# Patient Record
Sex: FEMALE | Race: WHITE | NOT HISPANIC OR LATINO | Employment: UNEMPLOYED | URBAN - NONMETROPOLITAN AREA
[De-identification: names, ages, dates, MRNs, and addresses within clinical notes are randomized per-mention and may not be internally consistent; named-entity substitution may affect disease eponyms.]

---

## 2017-04-25 ENCOUNTER — TELEPHONE (OUTPATIENT)
Dept: GASTROENTEROLOGY | Facility: CLINIC | Age: 47
End: 2017-04-25

## 2017-04-25 ENCOUNTER — DOCUMENTATION (OUTPATIENT)
Dept: GASTROENTEROLOGY | Facility: CLINIC | Age: 47
End: 2017-04-25

## 2017-04-25 DIAGNOSIS — R74.8 ELEVATED ALKALINE PHOSPHATASE LEVEL: Primary | ICD-10-CM

## 2017-04-25 DIAGNOSIS — R93.2 ABNORMAL CT OF LIVER: ICD-10-CM

## 2017-04-25 NOTE — PROGRESS NOTES
Called pt to see if she wanted to schedule for hepatic panel and liver us , left pt a message to call back if she wanted to schedule.

## 2017-04-25 NOTE — TELEPHONE ENCOUNTER
Patient is going to call insurance company and see how much she is going to be out of pocket with having the Ultrasound. She stated she will call back and let us know.

## 2017-09-22 ENCOUNTER — HOSPITAL ENCOUNTER (OUTPATIENT)
Dept: ULTRASOUND IMAGING | Facility: HOSPITAL | Age: 47
Discharge: HOME | End: 2017-09-22
Admitting: PHYSICIAN ASSISTANT

## 2017-09-22 DIAGNOSIS — R74.8 ELEVATED ALKALINE PHOSPHATASE LEVEL: ICD-10-CM

## 2017-09-22 DIAGNOSIS — R93.2 ABNORMAL CT OF LIVER: ICD-10-CM

## 2017-09-22 PROCEDURE — 76705 ECHO EXAM OF ABDOMEN: CPT | Performed by: RADIOLOGY

## 2017-09-22 PROCEDURE — 76705 ECHO EXAM OF ABDOMEN: CPT

## 2017-09-26 ENCOUNTER — TELEPHONE (OUTPATIENT)
Dept: GASTROENTEROLOGY | Facility: CLINIC | Age: 47
End: 2017-09-26

## 2017-09-26 NOTE — TELEPHONE ENCOUNTER
Patient has office visit scheduled 10/09/17. She used to see Dr Ramos. She had an U/S done and she said please tell her the results now because if not she will worry herself to death!

## 2017-09-28 NOTE — TELEPHONE ENCOUNTER
Left message with request for patient to have labs done prior to her appointment 10/9/17. I left our phone number if she needed to call us back.

## 2017-10-09 ENCOUNTER — OFFICE VISIT (OUTPATIENT)
Dept: GASTROENTEROLOGY | Facility: CLINIC | Age: 47
End: 2017-10-09

## 2017-10-09 VITALS
HEIGHT: 63 IN | WEIGHT: 179.4 LBS | SYSTOLIC BLOOD PRESSURE: 123 MMHG | BODY MASS INDEX: 31.79 KG/M2 | OXYGEN SATURATION: 97 % | DIASTOLIC BLOOD PRESSURE: 57 MMHG | HEART RATE: 86 BPM

## 2017-10-09 DIAGNOSIS — R10.13 EPIGASTRIC ABDOMINAL PAIN: Primary | ICD-10-CM

## 2017-10-09 DIAGNOSIS — K21.9 GASTROESOPHAGEAL REFLUX DISEASE, ESOPHAGITIS PRESENCE NOT SPECIFIED: ICD-10-CM

## 2017-10-09 PROCEDURE — 99214 OFFICE O/P EST MOD 30 MIN: CPT | Performed by: PHYSICIAN ASSISTANT

## 2017-10-09 RX ORDER — OMEPRAZOLE 40 MG/1
40 CAPSULE, DELAYED RELEASE ORAL DAILY
Qty: 30 CAPSULE | Refills: 5 | Status: SHIPPED | OUTPATIENT
Start: 2017-10-09 | End: 2017-12-06

## 2017-10-09 NOTE — PROGRESS NOTES
"Chief Complaint   Patient presents with   • Hepatic Disease   • Abdominal Pain     tenderness, epigastric   • Chest Pain   • Back Pain       Starr Gan is a 47 y.o. female who presents to the office today for follow up appointment regarding Hepatic Disease; Abdominal Pain (tenderness, epigastric); Chest Pain; and Back Pain.    HPI  She reports that over the past 2-3 weeks, she has noticed epigastric discomfort radiating into her central back. Pain also seems to be present in the RUQ radiating into the epigastric region then into her chest. She feels that this is related to her gallbladder or stomach. GERD is present but she takes Prilosec 40 mg about 2-3 times per week.     EGD on 10/11/16 performed by Dr. Ramos due to RUQ abdominal pain, diarrhea, nausea and bloating. Post-operative diagnoses are scalloping of the duodenal folds, increased erythema of the antrum, inflammation of the distal esophagus and a small hiatal hernia was noted.     Past CT liver protocol showed confluence of vessels in liver after abnormal CT liver.   US liver 9/22/2017:  FINDINGS:   Visualized pancreas is unremarkable.   The gallbladder is unremarkable without stones or wall thickening.   The CBD measures 2.7 mm    .  The liver demonstrates normal echogenicity without focal lesion.    No ascites demonstrated.   There is no sonographic Bassett sign.  IMPRESSION:  No sonographic findings to explain abdominal pain    9/22/2017  WBC 13.7  Neutr Abs 9.8  Trig 219  AST 15  ALT 19  Alkaline phoshate 173    Dr. Lewis had ordered HIDA which was normal and then had ERCP (told that her bile duct that was \"crimped up\") and she had pancreatitis after which resulted in a hospitalization several years ago. She does report now intermittent white stool and usually when her abdominal pain is at it's worst. Diarrhea is intermittent but not daily. Relates all of her recent symptoms to taking course of steroids but pain was relieved with Mylanta. When " she has these symptoms, she also has peripheral edema (ankles).     Negative celiac panel in past.    Review of Systems   Constitutional: Positive for fatigue. Negative for chills and fever.   HENT: Positive for congestion. Negative for sore throat, trouble swallowing and voice change.    Eyes: Positive for pain and visual disturbance.   Respiratory: Positive for cough. Negative for shortness of breath and wheezing.    Cardiovascular: Positive for chest pain, palpitations and leg swelling.   Gastrointestinal: Positive for abdominal distention, abdominal pain, diarrhea, nausea and vomiting. Negative for anal bleeding, blood in stool, constipation and rectal pain.   Endocrine: Positive for heat intolerance. Negative for cold intolerance.   Genitourinary: Negative for difficulty urinating and pelvic pain.   Musculoskeletal: Positive for back pain. Negative for arthralgias and myalgias.   Skin: Negative for rash and wound.   Allergic/Immunologic: Positive for environmental allergies. Negative for food allergies.   Neurological: Positive for dizziness. Negative for syncope, light-headedness and headaches.   Hematological: Does not bruise/bleed easily.   Psychiatric/Behavioral: Negative for sleep disturbance. The patient is nervous/anxious.        Past Medical History:   Diagnosis Date   • Depression    • Pancreatitis        Past Surgical History:   Procedure Laterality Date   • ENDOSCOPY N/A 10/11/2016    Procedure: ESOPHAGOGASTRODUODENOSCOPY WITH BIOPSY CPT CODE: 45189;  Surgeon: Kacie Ramos DO;  Benign small intestional mucosa with no alterations in duodenum; reactive gastropathy with focal acute and chronic inflamation in gastric antrum; reactive esophagogastic mucosa with mild acute and chronic inflamation in distal esophagus   • ERCP     • UPPER GASTROINTESTINAL ENDOSCOPY  2012    Dr ginny perez acid reflux       Family History   Problem Relation Age of Onset   • Rectal cancer Mother    • Colon polyps  "Mother    • Heart failure Father    • Thyroid disease Sister    • Thyroid disease Brother        Social History   Substance Use Topics   • Smoking status: Current Every Day Smoker     Packs/day: 0.25   • Smokeless tobacco: Not on file   • Alcohol use No       CURRENT MEDICATION:  •  cetirizine (ZyrTEC) 10 MG tablet, Take 10 mg by mouth Daily., Disp: , Rfl:   •  omeprazole (priLOSEC) 40 MG capsule, Take 1 capsule by mouth Daily., Disp: 30 capsule, Rfl: 5    ALLERGIES:  Review of patient's allergies indicates no known allergies.    VISIT VITALS:  /57  Pulse 86  Ht 63\" (160 cm)  Wt 179 lb 6.4 oz (81.4 kg)  SpO2 97%  BMI 31.78 kg/m2    Physical Exam   Constitutional: She is oriented to person, place, and time. She appears well-developed and well-nourished. No distress.   HENT:   Head: Normocephalic and atraumatic.   Right Ear: External ear normal.   Left Ear: External ear normal.   Nose: Nose normal.   Mouth/Throat: Oropharynx is clear and moist.   Eyes: Conjunctivae and EOM are normal. Right eye exhibits no discharge. Left eye exhibits no discharge. No scleral icterus.   Neck: Normal range of motion. Neck supple.   Cardiovascular: Normal rate, regular rhythm and normal heart sounds.  Exam reveals no gallop and no friction rub.    No murmur heard.  Pulmonary/Chest: Effort normal and breath sounds normal. No respiratory distress. She has no wheezes. She has no rales. She exhibits no tenderness.   Abdominal: Soft. Normal appearance and bowel sounds are normal. She exhibits no distension, no ascites and no mass. There is tenderness (epigastric). There is no rigidity and no guarding. No hernia.   Musculoskeletal: Normal range of motion. She exhibits no edema or deformity.   Neurological: She is alert and oriented to person, place, and time. She exhibits normal muscle tone. Coordination normal.   Skin: Skin is warm and dry. No rash noted. No erythema. No pallor.   Psychiatric: She has a normal mood and affect. Her " behavior is normal. Judgment and thought content normal.   Nursing note and vitals reviewed.      Assessment/Plan      Diagnosis Plan   1. Epigastric abdominal pain     2. Gastroesophageal reflux disease, esophagitis presence not specified  omeprazole (priLOSEC) 40 MG capsule     I have asked her to start taking Prilosec 40 mg once daily 30 minutes before a meal consistently for the next 6 weeks due to her complaints.    Regarding previous abnormal liver CT and normal US recently, I will check with radiology for explanation due to her concern.         Return in about 6 weeks (around 11/20/2017) for recheck abdominal pain.       Marisa Orozco PA-C       Electronically signed 10/9/2017 at 12:33 PM.

## 2017-11-13 ENCOUNTER — TELEPHONE (OUTPATIENT)
Dept: GASTROENTEROLOGY | Facility: CLINIC | Age: 47
End: 2017-11-13

## 2017-12-06 ENCOUNTER — OFFICE VISIT (OUTPATIENT)
Dept: GASTROENTEROLOGY | Facility: CLINIC | Age: 47
End: 2017-12-06

## 2017-12-06 VITALS
HEIGHT: 63 IN | OXYGEN SATURATION: 97 % | WEIGHT: 179.8 LBS | HEART RATE: 74 BPM | DIASTOLIC BLOOD PRESSURE: 67 MMHG | SYSTOLIC BLOOD PRESSURE: 120 MMHG | BODY MASS INDEX: 31.86 KG/M2

## 2017-12-06 DIAGNOSIS — K21.9 GASTROESOPHAGEAL REFLUX DISEASE, ESOPHAGITIS PRESENCE NOT SPECIFIED: Primary | ICD-10-CM

## 2017-12-06 DIAGNOSIS — R93.2 ABNORMAL CT OF LIVER: ICD-10-CM

## 2017-12-06 DIAGNOSIS — R10.811 RIGHT UPPER QUADRANT ABDOMINAL TENDERNESS WITHOUT REBOUND TENDERNESS: ICD-10-CM

## 2017-12-06 DIAGNOSIS — R19.7 DIARRHEA, UNSPECIFIED TYPE: ICD-10-CM

## 2017-12-06 DIAGNOSIS — Z98.890 HISTORY OF ERCP: ICD-10-CM

## 2017-12-06 DIAGNOSIS — R10.11 RUQ ABDOMINAL PAIN: ICD-10-CM

## 2017-12-06 PROCEDURE — 99214 OFFICE O/P EST MOD 30 MIN: CPT | Performed by: PHYSICIAN ASSISTANT

## 2017-12-06 RX ORDER — RANITIDINE 300 MG/1
300 TABLET ORAL 2 TIMES DAILY
Qty: 60 TABLET | Refills: 5 | Status: SHIPPED | OUTPATIENT
Start: 2017-12-06 | End: 2018-01-05

## 2017-12-06 RX ORDER — FLUOXETINE HYDROCHLORIDE 40 MG/1
40 CAPSULE ORAL DAILY
COMMUNITY

## 2017-12-06 NOTE — PROGRESS NOTES
": 1970    Chief Complaint   Patient presents with   • Abdominal Pain       Starr Gan is a 47 y.o. female who presents to the office today as a follow up appointment regarding Abdominal Pain.    History of Present Illness:  Diarrhea is occurring 3-4 times per week. It is light in color when it occurs. No skip days between bowel movements. Has ate orange and had diarrhea with oranges in it about 1 hour later. No abdominal cramping with it. Stools are loose when this happens. 20 years ago, she had a HIDA scan which was normal. RUQ abdominal discomfort has been present for several months now and is worse with bloating. She took Prilosec 40 mg once daily as directed for several weeks but noticed no change in her complaints with it.     Previous:  \"Past CT liver protocol showed confluence of vessels in liver after abnormal CT liver.   US liver 2017:  FINDINGS:   Visualized pancreas is unremarkable.   The gallbladder is unremarkable without stones or wall thickening.   The CBD measures 2.7 mm    .  The liver demonstrates normal echogenicity without focal lesion.    No ascites demonstrated.   There is no sonographic Bassett sign.  IMPRESSION:  No sonographic findings to explain abdominal pain     2017  WBC 13.7  Neutr Abs 9.8  Trig 219  AST 15  ALT 19  Alkaline phoshate 173     Dr. Lewis had ordered HIDA which was normal and then had ERCP (told that her bile duct that was \"crimped up\") and she had pancreatitis after which resulted in a hospitalization several years ago. She does report now intermittent white stool and usually when her abdominal pain is at it's worst. Diarrhea is intermittent but not daily. Relates all of her recent symptoms to taking course of steroids but pain was relieved with Mylanta. When she has these symptoms, she also has peripheral edema (ankles).      Negative celiac panel in past.\"    Review of Systems   Constitutional: Positive for fatigue. Negative for chills and fever. "   HENT: Positive for congestion. Negative for sore throat, trouble swallowing and voice change.    Eyes: Positive for pain and visual disturbance.   Respiratory: Positive for cough. Negative for shortness of breath and wheezing.    Cardiovascular: Positive for chest pain, palpitations and leg swelling.   Gastrointestinal: Positive for abdominal distention, abdominal pain, diarrhea and nausea. Negative for anal bleeding, blood in stool, constipation, rectal pain and vomiting.   Endocrine: Positive for heat intolerance. Negative for cold intolerance.   Genitourinary: Negative for difficulty urinating and pelvic pain.   Musculoskeletal: Positive for back pain. Negative for arthralgias and myalgias.   Skin: Negative for rash and wound.   Allergic/Immunologic: Positive for environmental allergies. Negative for food allergies.   Neurological: Positive for dizziness. Negative for syncope, light-headedness and headaches.   Hematological: Does not bruise/bleed easily.   Psychiatric/Behavioral: Negative for sleep disturbance. The patient is nervous/anxious.        Past Medical History:   Diagnosis Date   • Depression    • Pancreatitis        Past Surgical History:   Procedure Laterality Date   • ENDOSCOPY N/A 10/11/2016    Procedure: ESOPHAGOGASTRODUODENOSCOPY WITH BIOPSY CPT CODE: 93328;  Surgeon: Kacie Ramos DO;  Benign small intestional mucosa with no alterations in duodenum; reactive gastropathy with focal acute and chronic inflamation in gastric antrum; reactive esophagogastic mucosa with mild acute and chronic inflamation in distal esophagus   • ERCP     • UPPER GASTROINTESTINAL ENDOSCOPY  2012    Dr ginny perez acid reflux       Family History   Problem Relation Age of Onset   • Rectal cancer Mother    • Colon polyps Mother    • Heart failure Father    • Thyroid disease Sister    • Thyroid disease Brother        Social History     Social History   • Marital status:      Spouse name: N/A   • Number of  "children: N/A   • Years of education: N/A     Social History Main Topics   • Smoking status: Current Every Day Smoker     Packs/day: 0.25   • Smokeless tobacco: None   • Alcohol use No   • Drug use: No   • Sexual activity: Defer     Other Topics Concern   • None     Social History Narrative         Current Outpatient Prescriptions:   •  cetirizine (ZyrTEC) 10 MG tablet, Take 10 mg by mouth Daily., Disp: , Rfl:   •  FLUoxetine (PROZAC) 40 MG capsule, Take 40 mg by mouth Daily., Disp: , Rfl:   •  raNITIdine (ZANTAC) 300 MG tablet, Take 1 tablet by mouth 2 (Two) Times a Day., Disp: 60 tablet, Rfl: 5    Allergies:   Review of patient's allergies indicates no known allergies.    Vitals:  /67  Pulse 74  Ht 160 cm (63\")  Wt 81.6 kg (179 lb 12.8 oz)  SpO2 97%  BMI 31.85 kg/m2    Physical Exam   Constitutional: She is oriented to person, place, and time. She appears well-developed and well-nourished. No distress.   HENT:   Head: Normocephalic and atraumatic.   Right Ear: External ear normal.   Left Ear: External ear normal.   Nose: Nose normal.   Mouth/Throat: Oropharynx is clear and moist.   Eyes: Conjunctivae and EOM are normal. Right eye exhibits no discharge. Left eye exhibits no discharge. No scleral icterus.   Neck: Normal range of motion. Neck supple.   Cardiovascular: Normal rate, regular rhythm and normal heart sounds.  Exam reveals no gallop and no friction rub.    No murmur heard.  Pulmonary/Chest: Effort normal and breath sounds normal. No respiratory distress. She has no wheezes. She has no rales. She exhibits no tenderness.   Abdominal: Soft. Normal appearance and bowel sounds are normal. She exhibits no distension, no ascites and no mass. There is tenderness (mild generalized but worst in the epigastric region and RUQ). There is no rigidity and no guarding. No hernia.   Musculoskeletal: Normal range of motion. She exhibits no edema or deformity.   Neurological: She is alert and oriented to person, " place, and time. She exhibits normal muscle tone. Coordination normal.   Skin: Skin is warm and dry. No rash noted. No erythema. No pallor.   Psychiatric: She has a normal mood and affect. Her behavior is normal. Judgment and thought content normal.   Nursing note and vitals reviewed.      Assessment/Plan:  1. Gastroesophageal reflux disease, esophagitis presence not specified    2. Diarrhea, unspecified type    3. Abnormal CT of liver    4. Right upper quadrant abdominal tenderness without rebound tenderness    5. History of ERCP    6. RUQ abdominal pain        Orders Placed This Encounter   Procedures   • MRI abdomen w wo contrast mrcp     Discontinue Prilosec. Start Zantac 300 mg BID due to GERD.     After discussing her case with radiology and recent normal US, she will have MRCP to further evaluate complaints and possible liver lesion vs confluence of vessels.             Return in about 1 month (around 1/6/2018) for discussion of results.    Marisa Orozco PA-C      Electronically signed 12/20/2017 at 9:34 PM.

## 2018-01-05 ENCOUNTER — TELEPHONE (OUTPATIENT)
Dept: GASTROENTEROLOGY | Facility: CLINIC | Age: 48
End: 2018-01-05

## 2018-01-05 DIAGNOSIS — K21.9 GASTROESOPHAGEAL REFLUX DISEASE, ESOPHAGITIS PRESENCE NOT SPECIFIED: Primary | ICD-10-CM

## 2018-01-05 RX ORDER — OMEPRAZOLE 40 MG/1
40 CAPSULE, DELAYED RELEASE ORAL DAILY
Qty: 30 CAPSULE | Refills: 5 | Status: SHIPPED | OUTPATIENT
Start: 2018-01-05 | End: 2018-05-18 | Stop reason: SDUPTHER

## 2018-01-15 ENCOUNTER — APPOINTMENT (OUTPATIENT)
Dept: MRI IMAGING | Facility: HOSPITAL | Age: 48
End: 2018-01-15

## 2018-05-18 DIAGNOSIS — K21.9 GASTROESOPHAGEAL REFLUX DISEASE, ESOPHAGITIS PRESENCE NOT SPECIFIED: ICD-10-CM

## 2018-05-18 RX ORDER — OMEPRAZOLE 40 MG/1
40 CAPSULE, DELAYED RELEASE ORAL DAILY
Qty: 30 CAPSULE | Refills: 11 | Status: SHIPPED | OUTPATIENT
Start: 2018-05-18

## 2018-05-18 RX ORDER — OMEPRAZOLE 40 MG/1
CAPSULE, DELAYED RELEASE ORAL
Qty: 90 CAPSULE | OUTPATIENT
Start: 2018-05-18

## 2021-03-15 ENCOUNTER — TRANSCRIBE ORDERS (OUTPATIENT)
Dept: ADMINISTRATIVE | Facility: HOSPITAL | Age: 51
End: 2021-03-15

## 2021-03-15 DIAGNOSIS — Z01.818 OTHER SPECIFIED PRE-OPERATIVE EXAMINATION: Primary | ICD-10-CM

## 2021-03-18 ENCOUNTER — BULK ORDERING (OUTPATIENT)
Dept: CASE MANAGEMENT | Facility: OTHER | Age: 51
End: 2021-03-18

## 2021-03-18 DIAGNOSIS — Z23 IMMUNIZATION DUE: ICD-10-CM

## 2021-04-13 ENCOUNTER — IMMUNIZATION (OUTPATIENT)
Dept: VACCINE CLINIC | Facility: HOSPITAL | Age: 51
End: 2021-04-13

## 2021-04-13 PROCEDURE — 91300 HC SARSCOV02 VAC 30MCG/0.3ML IM: CPT | Performed by: NURSE PRACTITIONER

## 2021-04-13 PROCEDURE — 0001A: CPT | Performed by: NURSE PRACTITIONER

## 2021-05-04 ENCOUNTER — IMMUNIZATION (OUTPATIENT)
Dept: VACCINE CLINIC | Facility: HOSPITAL | Age: 51
End: 2021-05-04

## 2021-05-04 PROCEDURE — 91300 HC SARSCOV02 VAC 30MCG/0.3ML IM: CPT | Performed by: INTERNAL MEDICINE

## 2021-05-04 PROCEDURE — 0002A: CPT | Performed by: INTERNAL MEDICINE

## 2023-03-02 ENCOUNTER — TRANSCRIBE ORDERS (OUTPATIENT)
Dept: ADMINISTRATIVE | Facility: HOSPITAL | Age: 53
End: 2023-03-02
Payer: COMMERCIAL

## 2023-08-03 ENCOUNTER — OFFICE VISIT (OUTPATIENT)
Dept: CARDIOLOGY | Facility: CLINIC | Age: 53
End: 2023-08-03
Payer: COMMERCIAL

## 2023-08-03 VITALS
RESPIRATION RATE: 18 BRPM | WEIGHT: 174 LBS | HEIGHT: 63 IN | OXYGEN SATURATION: 98 % | HEART RATE: 71 BPM | BODY MASS INDEX: 30.83 KG/M2 | DIASTOLIC BLOOD PRESSURE: 72 MMHG | SYSTOLIC BLOOD PRESSURE: 126 MMHG

## 2023-08-03 DIAGNOSIS — R06.09 DYSPNEA ON EXERTION: ICD-10-CM

## 2023-08-03 DIAGNOSIS — Z82.49 FAMILY HISTORY OF CORONARY ARTERY DISEASE: ICD-10-CM

## 2023-08-03 DIAGNOSIS — I44.7 LBBB (LEFT BUNDLE BRANCH BLOCK): Primary | ICD-10-CM

## 2023-08-03 DIAGNOSIS — F17.200 SMOKER: ICD-10-CM

## 2023-08-03 DIAGNOSIS — E78.1 HYPERTRIGLYCERIDEMIA: ICD-10-CM

## 2023-08-03 DIAGNOSIS — I20.0 UNSTABLE ANGINA: ICD-10-CM

## 2023-08-03 DIAGNOSIS — Z72.0 TOBACCO USE: ICD-10-CM

## 2023-08-03 RX ORDER — ERGOCALCIFEROL 1.25 MG/1
50000 CAPSULE ORAL
COMMUNITY
Start: 2023-07-22

## 2023-08-03 RX ORDER — HYDROXYZINE HYDROCHLORIDE 25 MG/1
25 TABLET, FILM COATED ORAL EVERY 8 HOURS PRN
COMMUNITY
Start: 2023-07-24

## 2023-08-03 RX ORDER — LANOLIN ALCOHOL/MO/W.PET/CERES
50 CREAM (GRAM) TOPICAL DAILY
COMMUNITY

## 2023-08-03 RX ORDER — FOLIC ACID 1 MG/1
1 TABLET ORAL DAILY
COMMUNITY

## 2023-08-03 RX ORDER — SEMAGLUTIDE 0.5 MG/.5ML
0.5 INJECTION, SOLUTION SUBCUTANEOUS WEEKLY
COMMUNITY

## 2023-08-03 RX ORDER — HYDROCHLOROTHIAZIDE 12.5 MG/1
12.5 TABLET ORAL AS NEEDED
COMMUNITY

## 2023-08-25 ENCOUNTER — OFFICE VISIT (OUTPATIENT)
Dept: ENDOCRINOLOGY | Facility: CLINIC | Age: 53
End: 2023-08-25
Payer: COMMERCIAL

## 2023-08-25 VITALS
SYSTOLIC BLOOD PRESSURE: 120 MMHG | HEIGHT: 63 IN | OXYGEN SATURATION: 99 % | DIASTOLIC BLOOD PRESSURE: 75 MMHG | WEIGHT: 166 LBS | BODY MASS INDEX: 29.41 KG/M2 | HEART RATE: 63 BPM

## 2023-08-25 DIAGNOSIS — E06.3 HASHIMOTO'S THYROIDITIS: Primary | ICD-10-CM

## 2023-08-25 PROCEDURE — 99204 OFFICE O/P NEW MOD 45 MIN: CPT | Performed by: INTERNAL MEDICINE

## 2023-08-25 RX ORDER — LEVOTHYROXINE SODIUM 0.05 MG/1
50 TABLET ORAL DAILY
Qty: 90 TABLET | Refills: 3 | Status: SHIPPED | OUTPATIENT
Start: 2023-08-25 | End: 2024-08-24

## 2023-08-25 NOTE — ASSESSMENT & PLAN NOTE
SHE HAS + TPO AND STRUCTURAL THYROID DAMAGE FROM THIS. THE NODULES HAVE BEEN STABLE FOR YEARS AND I AM NOT WORRIED ABOUT THEM  ---------------  I PROPOSE A TRIAL OF LOW DOSE T4 TO SUPPRESS THE INFLAMMATION

## 2023-08-25 NOTE — PROGRESS NOTES
"     Office Note      Date: 2023  Patient Name: Starr Gan  MRN: [id number]  : 1970    Chief Complaint   Patient presents with    Thyroid Problem       History of Present Illness:   Starr Gan is a 53 y.o. female who presents for Thyroid Problem  .   Patient is seen for a new patient evaluation    She first was found to have thyroid nodules in about  or so. She saw tata and had a series of ultrasounds until . They remained stable.  She had another ultrasound in  and the report shows- 2 nodules on the right. 1.3 and 1.2 cm. These were stable from  to .  Recently she went to rheumatology to evaluate joint pain and swelling.  She was found to have high tpo (37 with normal up to 34 )  She was put on mobic. It helps the pain but not the swelling.  Weight loss has helped.  She is taking wegovy   Subjective      Patient was born where: [place] .  Facial radiation exposure: No.  High iodine intake: No  Family hx of thyroid disease: Yes, describe:  2 siblings with thyroid disease .    Review of Systems:   Review of Systems   Constitutional:  Positive for fatigue.   Endocrine: Positive for cold intolerance and heat intolerance.   Musculoskeletal:  Positive for arthralgias, myalgias and neck pain.   Neurological:  Positive for weakness.   Psychiatric/Behavioral:  Positive for decreased concentration.      The following portions of the patient's history were reviewed and updated as appropriate: allergies, current medications, past family history, past medical history, past social history, past surgical history, and problem list.    Objective     Visit Vitals  /75   Pulse 63   Ht 160 cm (63\")   Wt 75.3 kg (166 lb)   SpO2 99%   BMI 29.41 kg/mý           Physical Exam:  Physical Exam  Vitals reviewed.   Constitutional:       Appearance: Normal appearance.   HENT:      Head: Normocephalic and atraumatic.   Eyes:      Extraocular Movements: Extraocular movements intact.   Neck:     "  Comments: IRREGULAR RIGHT THYROID LOBE   Cardiovascular:      Rate and Rhythm: Normal rate.   Pulmonary:      Effort: Pulmonary effort is normal.   Lymphadenopathy:      Cervical: No cervical adenopathy.   Skin:     General: Skin is warm.   Neurological:      General: No focal deficit present.      Mental Status: She is alert.   Psychiatric:         Mood and Affect: Mood normal.         Thought Content: Thought content normal.         Judgment: Judgment normal.       Assessment / Plan      Assessment & Plan:  Problem List Items Addressed This Visit          Other    Hashimoto's thyroiditis - Primary    Current Assessment & Plan     SHE HAS + TPO AND STRUCTURAL THYROID DAMAGE FROM THIS. THE NODULES HAVE BEEN STABLE FOR YEARS AND I AM NOT WORRIED ABOUT THEM  ---------------  I PROPOSE A TRIAL OF LOW DOSE T4 TO SUPPRESS THE INFLAMMATION          Relevant Medications    levothyroxine (Synthroid) 50 MCG tablet        Sim Winklre MD   08/25/2023

## 2023-08-30 ENCOUNTER — TELEPHONE (OUTPATIENT)
Dept: CARDIOLOGY | Facility: CLINIC | Age: 53
End: 2023-08-30
Payer: COMMERCIAL

## 2023-08-30 NOTE — TELEPHONE ENCOUNTER
----- Message from Starr Gan sent at 8/30/2023 11:44 AM EDT -----  Regarding: Stress Test Results   Contact: [phone]  Thank you! I'm sure I'm overreacting but I'm a little nervous about it.     Stress test reviewed - normal perfusion, EF 58% - stress test low risk -  [facility] - unable to reach pt by telephone

## 2023-12-05 ENCOUNTER — OFFICE VISIT (OUTPATIENT)
Dept: ENDOCRINOLOGY | Facility: CLINIC | Age: 53
End: 2023-12-05
Payer: COMMERCIAL

## 2023-12-05 VITALS
SYSTOLIC BLOOD PRESSURE: 124 MMHG | HEART RATE: 82 BPM | HEIGHT: 63 IN | BODY MASS INDEX: 27.46 KG/M2 | WEIGHT: 155 LBS | DIASTOLIC BLOOD PRESSURE: 70 MMHG | OXYGEN SATURATION: 98 %

## 2023-12-05 DIAGNOSIS — E06.3 HASHIMOTO'S THYROIDITIS: Primary | ICD-10-CM

## 2023-12-05 PROCEDURE — 99213 OFFICE O/P EST LOW 20 MIN: CPT | Performed by: INTERNAL MEDICINE

## 2023-12-05 RX ORDER — METHYLPHENIDATE HYDROCHLORIDE 18 MG/1
18 TABLET ORAL EVERY MORNING
COMMUNITY
Start: 2023-12-04

## 2023-12-05 NOTE — PROGRESS NOTES
"     Office Note      Date: 2023  Patient Name: Starr Gan  MRN: [id number]  : 1970    Chief Complaint   Patient presents with    Hashimoto's Thyroiditis       History of Present Illness:   Starr Gan is a 53 y.o. female who presents for Hashimoto's Thyroiditis  .   Current rx: T4- 50 MCG PER     Changes in history:HAS LOST WEIGHT WITH WEGOVY   Questions /problems: SHE NOTES HAIR LOSS     Subjective          Review of Systems:   Review of Systems   HENT:  Negative for trouble swallowing.        The following portions of the patient's history were reviewed and updated as appropriate: allergies, current medications, past family history, past medical history, past social history, past surgical history, and problem list.    Objective     Visit Vitals  /70   Pulse 82   Ht 160 cm (63\")   Wt 70.3 kg (155 lb)   SpO2 98%   BMI 27.46 kg/m²           Physical Exam:  Physical Exam  Vitals reviewed.   Constitutional:       Appearance: Normal appearance.   Neurological:      Mental Status: She is alert.   Psychiatric:         Mood and Affect: Mood normal.         Thought Content: Thought content normal.         Judgment: Judgment normal.         Assessment / Plan      Assessment & Plan:  Problem List Items Addressed This Visit          Other    Hashimoto's thyroiditis - Primary    Current Assessment & Plan     FATIGUE IS BETTER  HAIR LOSS IS WORSE  TSH ON 11/15 WAS  2.08  SHE IS ON THE RIGHT DOSE            Relevant Medications    levothyroxine (Synthroid) 50 MCG tablet        Electronically signed by : Sim Winkler MD   2023      "

## 2024-04-15 DIAGNOSIS — E06.3 HASHIMOTO'S THYROIDITIS: ICD-10-CM

## 2024-04-15 RX ORDER — LEVOTHYROXINE SODIUM 0.05 MG/1
50 TABLET ORAL DAILY
Qty: 90 TABLET | Refills: 3 | Status: CANCELLED | OUTPATIENT
Start: 2024-04-15 | End: 2025-04-15

## 2024-04-15 RX ORDER — LEVOTHYROXINE SODIUM 0.05 MG/1
50 TABLET ORAL DAILY
Qty: 90 TABLET | Refills: 3 | Status: SHIPPED | OUTPATIENT
Start: 2024-04-15 | End: 2025-04-15

## 2024-04-15 NOTE — TELEPHONE ENCOUNTER
Rx Refill Note  Requested Prescriptions     Pending Prescriptions Disp Refills    levothyroxine (Synthroid) 50 MCG tablet 90 tablet 3     Sig: Take 1 tablet by mouth Daily.          Last office visit with prescribing clinician: 12/5/2023     Next office visit with prescribing clinician: 6/7/2024         Manju Diego MA  04/15/24, 12:19 EDT

## 2024-04-16 DIAGNOSIS — E06.3 HASHIMOTO'S THYROIDITIS: ICD-10-CM

## 2024-04-16 RX ORDER — LEVOTHYROXINE SODIUM 50 MCG
50 TABLET ORAL DAILY
Qty: 90 TABLET | Refills: 0 | OUTPATIENT
Start: 2024-04-16 | End: 2024-07-15

## 2024-04-16 NOTE — TELEPHONE ENCOUNTER
Caller: GUANACO GEORGE    Relationship to patient: SELF    Best call back number: [phone]    Patient is needing: PATIENTS MEDICATION SYNTHROID GOT LOST IN THE MAIL. THEY WILL NOT SEND HER A NEW ONE. SO TO GET HER MEDICATION SHE NEEDS A NEW PRESCRIPTION SENT TO A LOCAL PHARMACY [facility], [place] - [address] - [phone]  - [phone]

## 2024-06-07 ENCOUNTER — OFFICE VISIT (OUTPATIENT)
Dept: ENDOCRINOLOGY | Facility: CLINIC | Age: 54
End: 2024-06-07
Payer: COMMERCIAL

## 2024-06-07 VITALS
HEIGHT: 63 IN | WEIGHT: 162 LBS | HEART RATE: 75 BPM | DIASTOLIC BLOOD PRESSURE: 70 MMHG | OXYGEN SATURATION: 99 % | BODY MASS INDEX: 28.7 KG/M2 | SYSTOLIC BLOOD PRESSURE: 118 MMHG

## 2024-06-07 DIAGNOSIS — E06.3 HASHIMOTO'S THYROIDITIS: Primary | ICD-10-CM

## 2024-06-07 PROCEDURE — 84443 ASSAY THYROID STIM HORMONE: CPT | Performed by: INTERNAL MEDICINE

## 2024-06-07 RX ORDER — TRAMADOL HYDROCHLORIDE 50 MG/1
50 TABLET ORAL EVERY 6 HOURS PRN
COMMUNITY
Start: 2024-05-09

## 2024-06-07 NOTE — PROGRESS NOTES
"     Office Note      Date: 2024  Patient Name: Starr Gan  MRN: [id number]  : 1970    Chief Complaint   Patient presents with    Hashimoto's Thyroiditis    Hypothyroidism       History of Present Illness:   Starr Gan is a 53 y.o. female who presents for Hashimoto's Thyroiditis and Hypothyroidism  .   Current rx: T4- 50     Changes in history: SHE STOPPED THE WEGOVY AND DID REALLY WELL UNTIL SHE HAD TO TAKE STEROIDS AND THEN SHE GAINED WEIGHT   Questions /problems:SEE GINETTE     Subjective          Review of Systems:   Review of Systems   Musculoskeletal:  Positive for back pain.       The following portions of the patient's history were reviewed and updated as appropriate: allergies, current medications, past family history, past medical history, past social history, past surgical history, and problem list.    Objective     Visit Vitals  /70 (BP Location: Left arm, Patient Position: Sitting, Cuff Size: Adult)   Pulse 75   Ht 160 cm (63\")   Wt 73.5 kg (162 lb)   SpO2 99%   BMI 28.70 kg/m²           Physical Exam:  Physical Exam  Vitals reviewed.   Constitutional:       Appearance: Normal appearance.   Neck:      Comments: IRREGULAR THYROID WITHOUT DOMINANT NODULES   Lymphadenopathy:      Cervical: No cervical adenopathy.   Neurological:      Mental Status: She is alert.   Psychiatric:         Mood and Affect: Mood normal.         Thought Content: Thought content normal.         Judgment: Judgment normal.         Assessment / Plan      Assessment & Plan:  Problem List Items Addressed This Visit       Hashimoto's thyroiditis - Primary    Current Assessment & Plan     Clinically euthyroid. Thyroid levels ordered. Medication to be adjusted accordingly.          Relevant Medications    levothyroxine (Synthroid) 50 MCG tablet    Other Relevant Orders    TSH        Electronically signed by : Sim Winkler MD   2024    "

## 2024-06-08 LAB — TSH SERPL DL<=0.05 MIU/L-ACNC: 1.02 UIU/ML (ref 0.27–4.2)

## 2024-07-29 DIAGNOSIS — E06.3 HASHIMOTO'S THYROIDITIS: ICD-10-CM

## 2024-07-29 RX ORDER — LEVOTHYROXINE SODIUM 0.05 MG/1
50 TABLET ORAL DAILY
Qty: 90 TABLET | Refills: 0 | Status: SHIPPED | OUTPATIENT
Start: 2024-07-29 | End: 2024-10-27

## 2024-10-04 ENCOUNTER — OFFICE VISIT (OUTPATIENT)
Dept: CARDIOLOGY | Facility: CLINIC | Age: 54
End: 2024-10-04
Payer: COMMERCIAL

## 2024-10-04 VITALS
WEIGHT: 167.2 LBS | HEART RATE: 81 BPM | HEIGHT: 63 IN | DIASTOLIC BLOOD PRESSURE: 70 MMHG | SYSTOLIC BLOOD PRESSURE: 130 MMHG | BODY MASS INDEX: 29.62 KG/M2 | OXYGEN SATURATION: 96 %

## 2024-10-04 DIAGNOSIS — E78.2 MIXED HYPERLIPIDEMIA: ICD-10-CM

## 2024-10-04 DIAGNOSIS — I44.7 LBBB (LEFT BUNDLE BRANCH BLOCK): Primary | ICD-10-CM

## 2024-10-04 PROCEDURE — 99213 OFFICE O/P EST LOW 20 MIN: CPT | Performed by: INTERNAL MEDICINE

## 2024-10-04 NOTE — LETTER
October 4, 2024       No Recipients    Patient: Starr Gan   YOB: 1970   Date of Visit: 10/4/2024     Dear EJ Martin:       Thank you for referring Starr Gan to me for evaluation. Below are the relevant portions of my assessment and plan of care.    If you have questions, please do not hesitate to call me. I look forward to following Starr along with you.         Sincerely,        Malcolm Flores MD        CC:   No Recipients    Malcolm Flores MD  10/04/24 1355  Sign when Signing Visit  [facility]  Consultation H&P  Starr Gan  1970  [address]  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 Lema    CC:  Chief Complaint   Patient presents with   • LBBB (left bundle branch block)     1-Yr F/U       Allergies  Allergies   Allergen Reactions   • Gabapentin Unknown - Low Severity and Other (See Comments)     Anxious       Current Medications  Current Outpatient Medications   Medication Instructions   • folic acid (FOLVITE) 1 mg, Oral, Daily   • hydroCHLOROthiazide 12.5 mg, Oral, As Needed   • levothyroxine (SYNTHROID) 50 mcg, Oral, Daily   • methylphenidate 18 mg, Oral, Every Morning        History of Present Illness   HPI  Starr Gan is a 54 y.o. year old female here for follow up today.   She had undergone cervical spine fusion earlier this year  "per Dr. Lema.  She had transient vocal cord paralysis and had to see ENT with injections of her vocal cords.  States she had no cardiac issues periprocedurally  She states she has been off Concerta due to her left bundle branch block and states that this is the only thing that allows her to work and concentrate      OBJECTIVE:  Vitals:    10/04/24 1340   BP: 130/70   BP Location: Left arm   Patient Position: Sitting   Cuff Size: Adult   Pulse: 81   SpO2: 96%   Weight: 75.8 kg (167 lb 3.2 oz)   Height: 160 cm (63\")       Body mass index is 29.62 kg/m².     PHYSICAL EXAMINATION:  Constitutional:       Appearance: Healthy appearance. Not in distress.   Neck:      Vascular: JVD normal.   Pulmonary:      Effort: Pulmonary effort is normal.      Breath sounds: Normal breath sounds. No wheezing. No rhonchi. No rales.   Cardiovascular:      PMI at left midclavicular line. Normal rate. Regular rhythm. Normal S1. Normal S2.       Murmurs: There is no murmur.      No gallop.  No click. No rub.   Pulses:     Intact distal pulses.   Edema:     Peripheral edema absent.   Skin:     General: Skin is warm and dry.   Neurological:      General: No focal deficit present.      Mental Status: Alert and oriented to person, place and time.         Diagnostic Data:  Procedures        ASSESSMENT:   Diagnosis Plan   1. LBBB (left bundle branch block)        2. Mixed hyperlipidemia              PLAN:  LBBB low risk ischemic test this past year  No prohibitive cardiac risk for Concerta    Hypertriglyceridemia-with recent 13 pound weight gain.  She is able to lose weight and states she wishes to approach it from a dietary standpoint prior to medication      Malcolm Flores MD, FACC    "

## 2024-12-06 ENCOUNTER — OFFICE VISIT (OUTPATIENT)
Age: 54
End: 2024-12-06
Payer: COMMERCIAL

## 2024-12-06 VITALS
WEIGHT: 167 LBS | SYSTOLIC BLOOD PRESSURE: 126 MMHG | BODY MASS INDEX: 29.59 KG/M2 | DIASTOLIC BLOOD PRESSURE: 72 MMHG | HEIGHT: 63 IN | OXYGEN SATURATION: 99 % | HEART RATE: 91 BPM

## 2024-12-06 DIAGNOSIS — E06.3 HASHIMOTO'S THYROIDITIS: Primary | ICD-10-CM

## 2024-12-06 LAB
T4 FREE SERPL-MCNC: 1.36 NG/DL (ref 0.92–1.68)
TSH SERPL DL<=0.05 MIU/L-ACNC: 1.42 UIU/ML (ref 0.27–4.2)

## 2024-12-06 PROCEDURE — 84443 ASSAY THYROID STIM HORMONE: CPT | Performed by: INTERNAL MEDICINE

## 2024-12-06 PROCEDURE — 84439 ASSAY OF FREE THYROXINE: CPT | Performed by: INTERNAL MEDICINE

## 2024-12-06 NOTE — PROGRESS NOTES
"     Office Note      Date: 2024  Patient Name: Starr Gan  MRN: [id number]  : 1970    Chief Complaint   Patient presents with    Hashimoto's Thyroiditis       History of Present Illness:   Starr Gan is a 54 y.o. female who presents for Hashimoto's Thyroiditis  .   Current rx:t4 -50 mcg per day     Changes in history: had cervical fusion in July.  Ended up with vocal chord paralysis. She got injections and is partially recovered  Questions /problems: still has a sticking feeling in her throat     Subjective          Review of Systems:   Review of Systems   HENT:  Positive for voice change.        The following portions of the patient's history were reviewed and updated as appropriate: allergies, current medications, past family history, past medical history, past social history, past surgical history, and problem list.    Objective     Visit Vitals  /72 (BP Location: Left arm, Patient Position: Sitting, Cuff Size: Adult)   Pulse 91   Ht 160 cm (63\")   Wt 75.8 kg (167 lb)   SpO2 99%   BMI 29.58 kg/m²           Physical Exam:  Physical Exam  Vitals reviewed.   Constitutional:       Appearance: Normal appearance.   Neck:      Comments: Irregular thyroid without dominant nodules   Lymphadenopathy:      Cervical: No cervical adenopathy.   Neurological:      Mental Status: She is alert.   Psychiatric:         Mood and Affect: Mood normal.         Behavior: Behavior normal.         Thought Content: Thought content normal.         Judgment: Judgment normal.         Assessment / Plan      Assessment & Plan:  Problem List Items Addressed This Visit       Hashimoto's thyroiditis - Primary    Current Assessment & Plan     Stable  Clinically euthyroid. Thyroid levels ordered. Medication to be adjusted accordingly.          Relevant Medications    levothyroxine (Synthroid) 50 MCG tablet    Other Relevant Orders    T4, Free    TSH        Electronically signed by : Sim Winkler, " MD   12/06/2024

## 2024-12-09 DIAGNOSIS — E06.3 HASHIMOTO'S THYROIDITIS: ICD-10-CM

## 2024-12-09 RX ORDER — LEVOTHYROXINE SODIUM 50 UG/1
50 TABLET ORAL DAILY
Qty: 90 TABLET | Refills: 4 | Status: SHIPPED | OUTPATIENT
Start: 2024-12-09